# Patient Record
Sex: MALE | Race: WHITE | URBAN - METROPOLITAN AREA
[De-identification: names, ages, dates, MRNs, and addresses within clinical notes are randomized per-mention and may not be internally consistent; named-entity substitution may affect disease eponyms.]

---

## 2018-10-05 ENCOUNTER — OFFICE VISIT (OUTPATIENT)
Dept: ENT CLINIC | Age: 43
End: 2018-10-05

## 2018-10-05 VITALS
HEART RATE: 66 BPM | SYSTOLIC BLOOD PRESSURE: 125 MMHG | DIASTOLIC BLOOD PRESSURE: 75 MMHG | BODY MASS INDEX: 27.3 KG/M2 | HEIGHT: 70 IN | WEIGHT: 190.7 LBS

## 2018-10-05 DIAGNOSIS — R19.6 HALITOSIS: ICD-10-CM

## 2018-10-05 PROCEDURE — 99203 OFFICE O/P NEW LOW 30 MIN: CPT | Performed by: OTOLARYNGOLOGY

## 2018-10-05 RX ORDER — CHLORHEXIDINE GLUCONATE 0.12 MG/ML
15 RINSE ORAL 2 TIMES DAILY
Qty: 420 ML | Refills: 0 | Status: SHIPPED | OUTPATIENT
Start: 2018-10-05 | End: 2018-10-19

## 2018-10-05 RX ORDER — OMEPRAZOLE 40 MG/1
40 CAPSULE, DELAYED RELEASE ORAL DAILY
Qty: 30 CAPSULE | Refills: 3 | Status: SHIPPED | OUTPATIENT
Start: 2018-10-05

## 2018-10-22 ASSESSMENT — ENCOUNTER SYMPTOMS
TROUBLE SWALLOWING: 0
COUGH: 0
RHINORRHEA: 0
VOMITING: 0
SINUS PRESSURE: 0
SHORTNESS OF BREATH: 0

## 2018-10-22 NOTE — PROGRESS NOTES
associated with posterior oropharynx. Dr. Sherly Romero Otolaryngology/Facial Plastic Surgery Residency  Associate Clinical Professor:  Nori Becerril [facility]

## 2018-11-05 ENCOUNTER — OFFICE VISIT (OUTPATIENT)
Dept: ENT CLINIC | Age: 43
End: 2018-11-05

## 2018-11-05 VITALS
SYSTOLIC BLOOD PRESSURE: 120 MMHG | WEIGHT: 192.9 LBS | BODY MASS INDEX: 27.62 KG/M2 | DIASTOLIC BLOOD PRESSURE: 71 MMHG | HEART RATE: 60 BPM | HEIGHT: 70 IN

## 2018-11-05 DIAGNOSIS — R19.6 HALITOSIS: Primary | ICD-10-CM

## 2018-11-05 PROCEDURE — 99213 OFFICE O/P EST LOW 20 MIN: CPT | Performed by: OTOLARYNGOLOGY

## 2018-11-06 ENCOUNTER — TELEPHONE (OUTPATIENT)
Dept: ENT CLINIC | Age: 43
End: 2018-11-06

## 2018-11-17 ASSESSMENT — ENCOUNTER SYMPTOMS
COUGH: 0
VOMITING: 0
SHORTNESS OF BREATH: 0

## 2018-11-17 NOTE — PROGRESS NOTES
[facility]  Dr. Miguel A Cerna. Rosalie Bueno. Ms.Ed        Patient Name:  Kalyani Felton  :  1975     CHIEF C/O:    Chief Complaint   Patient presents with    Other     hailitios- has been since root canal 5-6 years- used mouth wash and that did not help. filling from tooth fell out yesterday       HISTORY OBTAINED FROM:  patient    HISTORY OF PRESENT ILLNESS:       Key Rivera is a 41y.o. year old male, here today for follow up of Recurrent episodes of halitosis. Patient states that he tried multiple things to improve including localized care and was recently started on Peridex swish and spit as well as reflux      History reviewed. No pertinent past medical history. History reviewed. No pertinent surgical history. Current Outpatient Prescriptions:     omeprazole (PRILOSEC) 40 MG delayed release capsule, Take 1 capsule by mouth daily, Disp: 30 capsule, Rfl: 3  Morphine  Social History   Substance Use Topics    Smoking status: Never Smoker    Smokeless tobacco: Never Used    Alcohol use No     History reviewed. No pertinent family history. Review of Systems   Constitutional: Negative for chills and fever. HENT: Negative for ear discharge and hearing loss. Respiratory: Negative for cough and shortness of breath. Cardiovascular: Negative for chest pain and palpitations. Gastrointestinal: Negative for vomiting. Skin: Negative for rash. Allergic/Immunologic: Negative for environmental allergies. Neurological: Negative for dizziness and headaches. Hematological: Does not bruise/bleed easily. All other systems reviewed and are negative. /71 (Site: Left Upper Arm, Position: Sitting, Cuff Size: Large Adult)   Pulse 60   Ht 5' 10\" (1.778 m)   Wt 192 lb 14.4 oz (87.5 kg)   BMI 27.68 kg/m²   Physical Exam   Constitutional: He is oriented to person, place, and time. He appears well-developed and well-nourished. HENT:   Head: Normocephalic and atraumatic.    Right Ear: